# Patient Record
Sex: FEMALE | Race: AMERICAN INDIAN OR ALASKA NATIVE | NOT HISPANIC OR LATINO | URBAN - METROPOLITAN AREA
[De-identification: names, ages, dates, MRNs, and addresses within clinical notes are randomized per-mention and may not be internally consistent; named-entity substitution may affect disease eponyms.]

---

## 2018-02-05 ENCOUNTER — OFFICE VISIT - HEALTHEAST (OUTPATIENT)
Dept: GERIATRICS | Facility: CLINIC | Age: 78
End: 2018-02-05

## 2018-02-05 DIAGNOSIS — I50.9 CHF (CONGESTIVE HEART FAILURE) (H): ICD-10-CM

## 2018-02-05 DIAGNOSIS — I10 HTN (HYPERTENSION): ICD-10-CM

## 2018-02-05 DIAGNOSIS — J44.9 COPD (CHRONIC OBSTRUCTIVE PULMONARY DISEASE) (H): ICD-10-CM

## 2018-02-06 ENCOUNTER — RECORDS - HEALTHEAST (OUTPATIENT)
Dept: LAB | Facility: CLINIC | Age: 78
End: 2018-02-06

## 2018-02-06 ENCOUNTER — AMBULATORY - HEALTHEAST (OUTPATIENT)
Dept: ADMINISTRATIVE | Facility: CLINIC | Age: 78
End: 2018-02-06

## 2018-02-06 LAB
ANION GAP SERPL CALCULATED.3IONS-SCNC: 8 MMOL/L (ref 5–18)
BUN SERPL-MCNC: 18 MG/DL (ref 8–28)
CALCIUM SERPL-MCNC: 8.8 MG/DL (ref 8.5–10.5)
CHLORIDE BLD-SCNC: 103 MMOL/L (ref 98–107)
CO2 SERPL-SCNC: 28 MMOL/L (ref 22–31)
CREAT SERPL-MCNC: 0.75 MG/DL (ref 0.6–1.1)
GFR SERPL CREATININE-BSD FRML MDRD: >60 ML/MIN/1.73M2
GLUCOSE BLD-MCNC: 108 MG/DL (ref 70–125)
POTASSIUM BLD-SCNC: 4 MMOL/L (ref 3.5–5)
SODIUM SERPL-SCNC: 139 MMOL/L (ref 136–145)

## 2018-02-06 RX ORDER — METOPROLOL SUCCINATE 25 MG/1
25 TABLET, EXTENDED RELEASE ORAL DAILY
Status: SHIPPED | COMMUNITY
Start: 2018-02-06

## 2018-02-06 RX ORDER — ALBUTEROL SULFATE 90 UG/1
2 AEROSOL, METERED RESPIRATORY (INHALATION) EVERY 4 HOURS PRN
Status: SHIPPED | COMMUNITY
Start: 2018-02-06

## 2018-02-06 RX ORDER — NYSTATIN 100000/G
1 POWDER (GRAM) TOPICAL 2 TIMES DAILY
Status: SHIPPED | COMMUNITY
Start: 2018-02-06

## 2018-02-06 RX ORDER — HYDROXYZINE HYDROCHLORIDE 25 MG/1
25-50 TABLET, FILM COATED ORAL EVERY 6 HOURS PRN
Status: SHIPPED | COMMUNITY
Start: 2018-02-06

## 2018-02-06 RX ORDER — SERTRALINE HYDROCHLORIDE 25 MG/1
25 TABLET, FILM COATED ORAL DAILY
Status: SHIPPED | COMMUNITY
Start: 2018-02-06

## 2018-02-06 RX ORDER — LOSARTAN POTASSIUM 50 MG/1
25 TABLET ORAL DAILY
Status: SHIPPED | COMMUNITY
Start: 2018-02-06

## 2018-02-06 RX ORDER — IPRATROPIUM BROMIDE AND ALBUTEROL SULFATE 2.5; .5 MG/3ML; MG/3ML
3 SOLUTION RESPIRATORY (INHALATION) 2 TIMES DAILY
Status: SHIPPED | COMMUNITY
Start: 2018-02-06

## 2018-02-06 RX ORDER — ASPIRIN 81 MG/1
81 TABLET, CHEWABLE ORAL DAILY
Status: SHIPPED | COMMUNITY
Start: 2018-02-06

## 2018-02-06 RX ORDER — OXYCODONE HYDROCHLORIDE 5 MG/1
5-10 TABLET ORAL EVERY 6 HOURS PRN
Status: SHIPPED | COMMUNITY
Start: 2018-02-06

## 2018-02-06 RX ORDER — FUROSEMIDE 20 MG
20 TABLET ORAL DAILY
Status: SHIPPED | COMMUNITY
Start: 2018-02-06

## 2018-02-06 RX ORDER — CHLORHEXIDINE GLUCONATE ORAL RINSE 1.2 MG/ML
15 SOLUTION DENTAL 2 TIMES DAILY
Status: SHIPPED | COMMUNITY
Start: 2018-02-06

## 2018-02-06 RX ORDER — AMOXICILLIN 250 MG
1 CAPSULE ORAL 2 TIMES DAILY PRN
Status: SHIPPED | COMMUNITY
Start: 2018-02-06

## 2018-02-09 ENCOUNTER — OFFICE VISIT - HEALTHEAST (OUTPATIENT)
Dept: GERIATRICS | Facility: CLINIC | Age: 78
End: 2018-02-09

## 2018-02-09 DIAGNOSIS — R09.02 HYPOXIA: ICD-10-CM

## 2018-02-09 DIAGNOSIS — I25.10 CAD (CORONARY ARTERY DISEASE): ICD-10-CM

## 2018-02-09 DIAGNOSIS — I10 HYPERTENSION: ICD-10-CM

## 2018-02-09 DIAGNOSIS — C34.90 LUNG CANCER (H): ICD-10-CM

## 2018-02-09 DIAGNOSIS — R53.81 PHYSICAL DECONDITIONING: ICD-10-CM

## 2018-02-09 DIAGNOSIS — J44.9 COPD (CHRONIC OBSTRUCTIVE PULMONARY DISEASE) (H): ICD-10-CM

## 2018-02-13 ENCOUNTER — OFFICE VISIT - HEALTHEAST (OUTPATIENT)
Dept: GERIATRICS | Facility: CLINIC | Age: 78
End: 2018-02-13

## 2018-02-13 DIAGNOSIS — R53.81 PHYSICAL DECONDITIONING: ICD-10-CM

## 2018-02-13 DIAGNOSIS — D64.9 ANEMIA: ICD-10-CM

## 2018-02-13 DIAGNOSIS — C34.90 LUNG CANCER (H): ICD-10-CM

## 2018-02-13 DIAGNOSIS — C68.9 UROTHELIAL CANCER (H): ICD-10-CM

## 2018-02-13 DIAGNOSIS — R09.02 HYPOXIA: ICD-10-CM

## 2018-02-13 DIAGNOSIS — G89.29 CHRONIC PAIN: ICD-10-CM

## 2018-02-16 ENCOUNTER — OFFICE VISIT - HEALTHEAST (OUTPATIENT)
Dept: GERIATRICS | Facility: CLINIC | Age: 78
End: 2018-02-16

## 2018-02-16 DIAGNOSIS — R53.81 PHYSICAL DECONDITIONING: ICD-10-CM

## 2018-02-16 DIAGNOSIS — D64.9 ANEMIA: ICD-10-CM

## 2018-02-16 DIAGNOSIS — R09.02 HYPOXIA: ICD-10-CM

## 2018-02-16 DIAGNOSIS — C68.9 UROTHELIAL CANCER (H): ICD-10-CM

## 2018-02-16 DIAGNOSIS — J44.9 COPD (CHRONIC OBSTRUCTIVE PULMONARY DISEASE) (H): ICD-10-CM

## 2018-02-19 ENCOUNTER — RECORDS - HEALTHEAST (OUTPATIENT)
Dept: LAB | Facility: CLINIC | Age: 78
End: 2018-02-19

## 2018-02-19 LAB
ERYTHROCYTE [DISTWIDTH] IN BLOOD BY AUTOMATED COUNT: 17.7 % (ref 11–14.5)
HCT VFR BLD AUTO: 29.8 % (ref 35–47)
HGB BLD-MCNC: 8.9 G/DL (ref 12–16)
MCH RBC QN AUTO: 27.6 PG (ref 27–34)
MCHC RBC AUTO-ENTMCNC: 29.9 G/DL (ref 32–36)
MCV RBC AUTO: 92 FL (ref 80–100)
PLATELET # BLD AUTO: 279 THOU/UL (ref 140–440)
PMV BLD AUTO: 11 FL (ref 8.5–12.5)
RBC # BLD AUTO: 3.23 MILL/UL (ref 3.8–5.4)
WBC: 9.7 THOU/UL (ref 4–11)

## 2018-02-21 ENCOUNTER — AMBULATORY - HEALTHEAST (OUTPATIENT)
Dept: GERIATRICS | Facility: CLINIC | Age: 78
End: 2018-02-21

## 2021-06-15 NOTE — PROGRESS NOTES
[facility]    Facility:   [facility] [[id number]]   Code Status: FULL CODE      CHIEF COMPLAINT/REASON FOR VISIT:  Chief Complaint   Patient presents with     Follow Up     rehab, copd, decond       HISTORY:      HPI: Debra is a 77 y.o. female who I had a chance to visit with secondary to her January 26 through January 31st 2018 secondary to acute respiratory failure which did require BiPAP therapy and intensive care unit along with a history of COPD chronic systolic congestive heart failure multiple malignancies including lung cancer , urothelial cancer of the right kidney common multiple skeletal and liver metastases along with hypertension chronic pain depression.  She currently is in the transitional care unit try to improve her overall strength and conditioning she really does not want to be here she wants to go home.  She does admit that she is probably even beyond her baseline while at home.  She wants to go back home.  She is on oxygen which is chronic for her.  She does have a chronic cough.  She has been normotensive and afebrile.  She is being treated for her chronic comorbid conditions.  Denies heartburn or reflux.  Is getting her nebulizations.    Past Medical History:   Diagnosis Date     Acute respiratory failure with hypoxia      Anxiety      COPD with exacerbation      GERD (gastroesophageal reflux disease)      Heart failure      HTN (hypertension)      Major depressive disorder      Malignant neoplasm of urinary organ      Osteoporosis      Personal history of malignant neoplasm of bronchus and lung      Personal history of malignant neoplasm of nasal cavities, middle ear, and accessory sinuses              No family history on file.  Social History     Social History     Marital status:      Spouse name: N/A     Number of children: N/A     Years of education: N/A     Social History Main Topics     Smoking status: Not on file     Smokeless  tobacco: Not on file     Alcohol use Not on file     Drug use: Not on file     Sexual activity: Not on file     Other Topics Concern     Not on file     Social History Narrative     No narrative on file         Review of Systems  Currently she denies any new URI symptoms including colds flu's chills fever wheezing chest pain dizziness vertigo or flulike symptoms rashes sore stiff neck swollen glands or headaches.  History of multiple cancer chronic pain hypertension CAD COPD      Current Outpatient Prescriptions:      albuterol (PROAIR HFA;PROVENTIL HFA;VENTOLIN HFA) 90 mcg/actuation inhaler, Inhale 2 puffs every 4 (four) hours as needed for wheezing., Disp: , Rfl:      aspirin 81 mg chewable tablet, Chew 81 mg daily., Disp: , Rfl:      chlorhexidine (PERIDEX) 0.12 % solution, Apply 15 mL to the mouth or throat 2 (two) times a day., Disp: , Rfl:      furosemide (LASIX) 20 MG tablet, Take 20 mg by mouth daily., Disp: , Rfl:      hydrOXYzine HCl (ATARAX) 25 MG tablet, Take 25-50 mg by mouth every 6 (six) hours as needed for itching., Disp: , Rfl:      ipratropium-albuterol (DUO-NEB) 0.5-2.5 mg/3 mL nebulizer, Inhale 3 mL 2 (two) times a day. And 1 inhalation every 4 hours prn, Disp: , Rfl:      losartan (COZAAR) 50 MG tablet, Take 25 mg by mouth daily., Disp: , Rfl:      metoprolol succinate (TOPROL-XL) 25 MG, Take 25 mg by mouth daily., Disp: , Rfl:      morphine (MS CONTIN) 30 MG 12 hr tablet, Take 30 mg by mouth 2 (two) times a day., Disp: , Rfl:      nystatin (MYCOSTATIN) powder, Apply 1 application topically 2 (two) times a day., Disp: , Rfl:      omeprazole (PRILOSEC) 20 MG capsule, Take 20 mg by mouth daily before breakfast., Disp: , Rfl:      oxyCODONE (ROXICODONE) 5 MG immediate release tablet, Take 5-10 mg by mouth every 6 (six) hours as needed for pain., Disp: , Rfl:      senna-docusate (SENNOSIDES-DOCUSATE SODIUM) 8.6-50 mg tablet, Take 1 tablet by mouth 2 (two) times a day as needed for constipation.,  Disp: , Rfl:      sertraline (ZOLOFT) 25 MG tablet, Take 25 mg by mouth daily., Disp: , Rfl:     .There were no vitals filed for this visit.  Blood pressure 109/70 pulse 79 respirations 18 temperature 96.6  Physical Exam   Constitutional: No distress.   HENT:   Head: Normocephalic.   Eyes: Pupils are equal, round, and reactive to light.   Neck: Neck supple. No thyromegaly present.   Cardiovascular: Normal rate, regular rhythm and normal heart sounds.    Pulmonary/Chest: Breath sounds normal.   COPD.  Chronic hypoxia.  Lung cancer.  Rhonchi.   Abdominal: Bowel sounds are normal. There is no tenderness. There is no guarding.   Musculoskeletal:   Chronic pain.  Deconditioned.   Lymphadenopathy:     She has no cervical adenopathy.   Neurological: She is alert.   Skin: Skin is warm and dry. No rash noted.         LABS:   Lab Results   Component Value Date    WBC 8.5 12/27/2017    HGB 10.3 (L) 12/27/2017    HCT 31.6 (L) 12/27/2017    MCV 89 12/27/2017     12/27/2017     Results for orders placed or performed in visit on 02/06/18   Basic Metabolic Panel   Result Value Ref Range    Sodium 139 136 - 145 mmol/L    Potassium 4.0 3.5 - 5.0 mmol/L    Chloride 103 98 - 107 mmol/L    CO2 28 22 - 31 mmol/L    Anion Gap, Calculation 8 5 - 18 mmol/L    Glucose 108 70 - 125 mg/dL    Calcium 8.8 8.5 - 10.5 mg/dL    BUN 18 8 - 28 mg/dL    Creatinine 0.75 0.60 - 1.10 mg/dL    GFR MDRD Af Amer >60 >60 mL/min/1.73m2    GFR MDRD Non Af Amer >60 >60 mL/min/1.73m2           ASSESSMENT:      ICD-10-CM    1. COPD (chronic obstructive pulmonary disease) J44.9    2. Hypoxia R09.02    3. Physical deconditioning R53.81    4. Hypertension I10    5. CAD (coronary artery disease) I25.10    6. Lung cancer C34.90        PLAN:    At this time continue to manage and follow.  She is getting pain medication.  She is also being seen by speech therapy.  Getting extra house nutrient supplements.  Getting her nebulizations.  At this time she did not have  any other further questions or concerns did have a BMP in February 6.      Electronically signed by: Michael Duane Johnson, CNP

## 2021-06-15 NOTE — PROGRESS NOTES
[facility]    Facility:   [facility] [[id number]]   Code Status: POLST AVAILABLE      Admission evaluation to TCU of 77-year-old female. History is taken from accompanying hospital notes, patient is able to provide a limited history. Chronic pulmonary disease including COPD, status post lung cancer times two, hypertension, chronic pain secondary to skeletal and liver metastases from progressive urothelialcarcinoma right kidney, coronary artery disease nonobstructive, presented to hospital with complaints of progressive cough and dyspnea. Diagnosed with acute respiratory failure, ICU admission with BiPAP, treated with Zithromax prednisone and nebulizer, improvement in pulmonary status, acute congestive heart failure required initiation of Lasix which is continued, stabilized and transferred to TCU for rehabilitation, observation of clinical status, management of medical problems which additionally include deconditioning, constipation.    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 Hypertension on beta blocker and losartan. Congestive heart failure compensated on Lasix. COPD. Chronic depression and anxiety on Zoloft. GERD on omeprazole. Lung cancer right mid lobe status post radiation therapy 2010. 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 Meningioma.    Family history: coronary artery disease and cataracts.    Social history: 35 pack smoking history, non-smoker at present, no alcohol intake, retired, , six children.    Past surgical history: cataract extraction, hysterectomy, vein stripping, neck dissection and partial pharyngectomy. Right ankle fracture status post pinning.    Medication: Lasix 20 mg Q day, Cozaar 25 mg Q day,  albuterol inhaler two puffs Q4 hour PRN, aspirin 81, hydroxyzine 25 - 50 mg Q6 hours PRN anxiety, DuoNeb BID and Q4 hours PRN, metoprolol 25 mg Q day, Prilosec 20 mg Q day, nitroglycerin 0.4 mg sublingual PRN, oxycodone 5 - 10 mg Q6 hours PRN, senna S one tablet BID UT N, MS Contin 30 mg BID.    Drug allergies: hydrocodone, ampicillin, lisinopril.    Review of systems: profound fatigue. Breathing  has returned to baseline, no dyspnea at rest. Requiring supplemental 02. Chronic cough. No active pain. Weakness bilateral lower extremities. MS Contin and oxycodone adequately control pain. No cardiac symptomatology. Denies orthopnea or PND. No focal neurologic deficits. Remainder of 12 point review of systems obtained negative.    Exam: vital signs reviewed at facility. Patient sitting in chair, supplemental 02 in place, affect flat, oriented times three, no use of accessory muscles at rest. Mucous membranes dry, crowded oral pharynx. No facial asymmetry. Evidence of previous neck dissection, no cervical masses, no supraclavicular nodes. Thyroid without nodularity or tenderness. S1 and S2 regular with faint systolic murmur with local radiation. Pulmonary exam with diffuse coarse lung sounds, phonic augmentation of sounds from mid airway scattered, no wheezes or rales. Poor respiratory excursion, Limited inspiratory effort. Delay in inspiratory flow. Abdomen without masses organomegaly or tenderness. Periphery with nonpitting edema, no calf tenderness or swelling. Pedal pulses diminished and symmetrical. DJD changes of knees without focal tenderness or acute inflammatory change. No hand drift. Skin turgor intact. Muscle tone and strength diminished upper and lower extremities.    Impression and plan:   exacerbation of COPD, treated with prednisone, Zithromax, BiPAP, nebulizer, continuing supplemental 02 with satisfactory oxygenation. Abnormal pulmonary examination, status post radiation therapy for lung cancer, continue  nebulization, no evidence of acute infection.   Stage IV urothelial cancer with metastases to bone and liver, followed by oncology, recent chemotherapy discontinued secondary to myocarditis, no symptoms of myocarditis on exam, chemotherapy on hold.   Congestive heart failure compensated on low-dose Lasix.   Hypertension on losartan 25 mg and metoprolol 25 mg with satisfactory control of blood pressure.   Chronic depression and anxiety on Zoloft with hydroxyzine PRN for anxiety, mood stable.   Chronic pain syndrome on MS Contin with oxycodone PRN, pain adequately controlled, no indication of excessive sedation or confusion with medication.   Significant deconditioning with need for rehabilitation.   Extensive outside medical records are reviewed, care plan and medications reviewed, multiple medical issues reviewed.    Electronically signed by: Megan Ludwig MD

## 2021-06-16 PROBLEM — R53.81 PHYSICAL DECONDITIONING: Status: ACTIVE | Noted: 2018-02-09

## 2021-06-16 PROBLEM — I25.10 CAD (CORONARY ARTERY DISEASE): Status: ACTIVE | Noted: 2018-02-09

## 2021-06-16 PROBLEM — R09.02 HYPOXIA: Status: ACTIVE | Noted: 2018-02-09

## 2021-06-16 PROBLEM — J44.9 COPD (CHRONIC OBSTRUCTIVE PULMONARY DISEASE) (H): Status: ACTIVE | Noted: 2018-02-09

## 2021-06-16 PROBLEM — I10 HYPERTENSION: Status: ACTIVE | Noted: 2018-02-09

## 2021-06-16 PROBLEM — C34.90 LUNG CANCER (H): Status: ACTIVE | Noted: 2018-02-09

## 2021-06-16 NOTE — PROGRESS NOTES
[facility]    Facility:   [facility] [[id number]]   Code Status: DNR/DNI      CHIEF COMPLAINT/REASON FOR VISIT:  Chief Complaint   Patient presents with     Review Of Multiple Medical Conditions       HISTORY:      HPI: Debra is a 77 y.o. female seen today in close follow-up.  She was seen in her room.  She was wearing her oxygen.  She denies any shortness of breath more than usual.  She has a 2 L of oxygen per nasal cannula.  Last time I saw her, her blood work came back with a hemoglobin of 6.7.  At that time she was feeling weak and short of breath.  She was sent to [facility] ER where they did another hemogram.  Her hemogram resulted back at 8.9 which is very close to her baseline.  She did not present with any active bleeding and so she was sent back with no blood transfusion.    I am looking at epic to find her hemogram again in it is not in the system anymore.  No other new symptoms.    Past Medical History:   Diagnosis Date     Acute respiratory failure with hypoxia      Anxiety      COPD with exacerbation      GERD (gastroesophageal reflux disease)      Heart failure      HTN (hypertension)      Major depressive disorder      Malignant neoplasm of urinary organ      Osteoporosis      Personal history of malignant neoplasm of bronchus and lung      Personal history of malignant neoplasm of nasal cavities, middle ear, and accessory sinuses              No family history on file.  Social History     Social History     Marital status:      Spouse name: N/A     Number of children: N/A     Years of education: N/A     Social History Main Topics     Smoking status: Not on file     Smokeless tobacco: Not on file     Alcohol use Not on file     Drug use: Not on file     Sexual activity: Not on file     Other Topics Concern     Not on file     Social History Narrative     No narrative on file         Review of Systems  As above otherwise unremarkable  .There were no  vitals filed for this visit.    Physical Exam  Vital signs noted and stable saturating 96% on 2 L  Patient is alert, awake, oriented to time, place and person, not in acute cardiorespiratory distress  Skin: Warm, and moist,  no lesions,   Head: atraumatic, normocephalic,   Eyes: EOM's intact and conjugate, pink palpebral conjunctivae, anicteric sclerae, pupils reactive  Lungs : Decreased but clear to auscultation , no crackles, wheezes or rhonchi  Heart : Nornal first and second heart sounds, No murmurs, heaves, or thrills  Abdomen: Soft, non tender, non distended, no hepatosplenomegaly, no ascites  Extremities: No edema, pulses are full and equal      LABS:   No results found for this or any previous visit (from the past 72 hour(s)).      ASSESSMENT:      ICD-10-CM    1. Physical deconditioning R53.81    2. COPD (chronic obstructive pulmonary disease) J44.9    3. Hypoxia R09.02    4. Urothelial cancer C68.9    5. Anemia D64.9        PLAN:    Continue with physical therapy and Occupational Therapy.  She has not been to go through chemotherapy anymore because of myositis.  Recheck another hemogram next week.      Total 25 minutes of which 55% was spent counseling and coordination of care of the above plan.    Electronically signed by: Wade Esparza MD

## 2021-06-16 NOTE — PROGRESS NOTES
[facility]    Facility:   [facility] [[id number]]   Code Status: DNR/DNI      CHIEF COMPLAINT/REASON FOR VISIT:  Chief Complaint   Patient presents with     Review Of Multiple Medical Conditions       HISTORY:      HPI: Debra is a 77 y.o. female who I am seeing today in close follow-up of her multiple issues.  Reviewed records from outside hospital.  She has a history of lung cancer ×2 status post radiation.  Also has a history of stage IV metastatic urothelial cancer metastatic to the bone and liver.  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  Also has severe COPD on chronic O2.  Went to the hospital because of respiratory distress and respiratory failure.  Was treated in the intensive care unit with bilevel positive airway pressure no strong suspicion for infection/pneumonia.  Was able to be weaned off from the BiPAP and currently is on oxygen 2 L.  Chemotherapeutic agent continues to be on hold secondary to myocarditis.  She has chronic pain secondary to her metastases requiring morphine and oxycodone.  Today she came back from CT scan of the chest that was ordered by her oncologist Dr. Garcia.  She was not feeling very well.  She feels very very tired.  Denies any shortness of breath but does get fatigued easily.  She does look pale.  Denies any bleeding from her nose or gums or urine or stool.  No abdominal pain.  No nausea no vomiting.  No palpitations.  Blood work came back today showing normal BMP however we got an alert from the lab with her hemoglobin down to 6.7.  Review old records again her hemoglobin on February 2 was 10.7.    She lives by herself in an apartment with her dog and apparent.    Past Medical History:   Diagnosis Date     Acute respiratory failure with hypoxia      Anxiety      COPD with exacerbation      GERD  (gastroesophageal reflux disease)      Heart failure      HTN (hypertension)      Major depressive disorder      Malignant neoplasm of urinary organ      Osteoporosis      Personal history of malignant neoplasm of bronchus and lung      Personal history of malignant neoplasm of nasal cavities, middle ear, and accessory sinuses              No family history on file.  Social History     Social History     Marital status:      Spouse name: N/A     Number of children: N/A     Years of education: N/A     Social History Main Topics     Smoking status: Not on file     Smokeless tobacco: Not on file     Alcohol use Not on file     Drug use: Not on file     Sexual activity: Not on file     Other Topics Concern     Not on file     Social History Narrative     No narrative on file         Review of Systems  As above  .There were no vitals filed for this visit.    Physical Exam  Vital signs noted and stable.  Using 2 L of oxygen per nasal cannula  Patient is alert, awake, disoriented to time and place , oriented to self person, not in acute cardiorespiratory distress  Skin: Warm, and moist,  no lesions,   Head: atraumatic, normocephalic,   Eyes: EOM's intact and conjugate, pink palpebral conjunctivae, anicteric sclerae, pupils reactive  Lungs : Decreased but clear to auscultation , no crackles, wheezes or rhonchi  Heart : Nornal first and second heart sounds, No murmurs, heaves, or thrills  Abdomen: Soft, non tender, non distended, no hepatosplenomegaly, no ascites  Extremities: No edema, pulses are full and equal      LABS:   No results found for this or any previous visit (from the past 72 hour(s)).  No results found for this or any previous visit (from the past 72 hour(s)).  No results found for this or any previous visit (from the past 168 hour(s)).      ASSESSMENT:      ICD-10-CM    1. Anemia D64.9    2. Hypoxia R09.02    3. Physical deconditioning R53.81    4. Lung cancer C34.90    5. Urothelial cancer C68.9     6. Chronic pain G89.29        PLAN:    An acute drop of 4 g of hemoglobin from previous.  This warrants sending patient back to regions for transfusion plus further evaluation for the source of bleeding.  She does not seem to be in any active heart failure  She remains on O2 2 L.  Continue with nebs and diuretic.  Continue with losartan and metoprolol for blood pressure control.  She remains on morphine and oxycodone for pain control.  There had been some discussions about hospice possibly being involved in the hospital, I do not know where this is at this time.  Currently at contact guard assist with dressing and toileting.  Total 35 minutes of which 55% was spent counseling and coordination of care of the above plan.    Electronically signed by: Wade Esparza MD